# Patient Record
Sex: MALE | Race: WHITE | NOT HISPANIC OR LATINO | URBAN - METROPOLITAN AREA
[De-identification: names, ages, dates, MRNs, and addresses within clinical notes are randomized per-mention and may not be internally consistent; named-entity substitution may affect disease eponyms.]

---

## 2017-05-08 ENCOUNTER — EMERGENCY (EMERGENCY)
Facility: HOSPITAL | Age: 27
LOS: 1 days | Discharge: HOME | End: 2017-05-08
Attending: EMERGENCY MEDICINE | Admitting: EMERGENCY MEDICINE
Payer: COMMERCIAL

## 2017-05-08 VITALS
SYSTOLIC BLOOD PRESSURE: 111 MMHG | TEMPERATURE: 98 F | RESPIRATION RATE: 14 BRPM | HEART RATE: 58 BPM | DIASTOLIC BLOOD PRESSURE: 61 MMHG | OXYGEN SATURATION: 100 %

## 2017-05-08 VITALS
SYSTOLIC BLOOD PRESSURE: 138 MMHG | TEMPERATURE: 98 F | OXYGEN SATURATION: 100 % | DIASTOLIC BLOOD PRESSURE: 80 MMHG | WEIGHT: 149.91 LBS | HEIGHT: 70 IN | RESPIRATION RATE: 14 BRPM | HEART RATE: 72 BPM

## 2017-05-08 LAB
ALBUMIN SERPL ELPH-MCNC: 3.9 G/DL — SIGNIFICANT CHANGE UP (ref 3.3–5)
ALP SERPL-CCNC: 64 U/L — SIGNIFICANT CHANGE UP (ref 30–120)
ALT FLD-CCNC: 22 U/L DA — SIGNIFICANT CHANGE UP (ref 10–60)
ANION GAP SERPL CALC-SCNC: 7 MMOL/L — SIGNIFICANT CHANGE UP (ref 5–17)
AST SERPL-CCNC: 19 U/L — SIGNIFICANT CHANGE UP (ref 10–40)
BASOPHILS # BLD AUTO: 0.1 K/UL — SIGNIFICANT CHANGE UP (ref 0–0.2)
BASOPHILS NFR BLD AUTO: 1.3 % — SIGNIFICANT CHANGE UP (ref 0–2)
BILIRUB SERPL-MCNC: 0.5 MG/DL — SIGNIFICANT CHANGE UP (ref 0.2–1.2)
BUN SERPL-MCNC: 12 MG/DL — SIGNIFICANT CHANGE UP (ref 7–23)
CALCIUM SERPL-MCNC: 8.7 MG/DL — SIGNIFICANT CHANGE UP (ref 8.4–10.5)
CHLORIDE SERPL-SCNC: 105 MMOL/L — SIGNIFICANT CHANGE UP (ref 96–108)
CO2 SERPL-SCNC: 29 MMOL/L — SIGNIFICANT CHANGE UP (ref 22–31)
CREAT SERPL-MCNC: 0.83 MG/DL — SIGNIFICANT CHANGE UP (ref 0.5–1.3)
D DIMER BLD IA.RAPID-MCNC: <150 NG/ML DDU — SIGNIFICANT CHANGE UP
EOSINOPHIL # BLD AUTO: 0 K/UL — SIGNIFICANT CHANGE UP (ref 0–0.5)
EOSINOPHIL NFR BLD AUTO: 0.4 % — SIGNIFICANT CHANGE UP (ref 0–6)
GLUCOSE SERPL-MCNC: 101 MG/DL — HIGH (ref 70–99)
HCT VFR BLD CALC: 45.1 % — SIGNIFICANT CHANGE UP (ref 39–50)
HGB BLD-MCNC: 15.5 G/DL — SIGNIFICANT CHANGE UP (ref 13–17)
LYMPHOCYTES # BLD AUTO: 2.2 K/UL — SIGNIFICANT CHANGE UP (ref 1–3.3)
LYMPHOCYTES # BLD AUTO: 27 % — SIGNIFICANT CHANGE UP (ref 13–44)
MCHC RBC-ENTMCNC: 30.5 PG — SIGNIFICANT CHANGE UP (ref 27–34)
MCHC RBC-ENTMCNC: 34.4 GM/DL — SIGNIFICANT CHANGE UP (ref 32–36)
MCV RBC AUTO: 88.7 FL — SIGNIFICANT CHANGE UP (ref 80–100)
MONOCYTES # BLD AUTO: 0.6 K/UL — SIGNIFICANT CHANGE UP (ref 0–0.9)
MONOCYTES NFR BLD AUTO: 7.9 % — SIGNIFICANT CHANGE UP (ref 2–14)
NEUTROPHILS # BLD AUTO: 5.1 K/UL — SIGNIFICANT CHANGE UP (ref 1.8–7.4)
NEUTROPHILS NFR BLD AUTO: 63.4 % — SIGNIFICANT CHANGE UP (ref 43–77)
PLATELET # BLD AUTO: 192 K/UL — SIGNIFICANT CHANGE UP (ref 150–400)
POTASSIUM SERPL-MCNC: 4.3 MMOL/L — SIGNIFICANT CHANGE UP (ref 3.5–5.3)
POTASSIUM SERPL-SCNC: 4.3 MMOL/L — SIGNIFICANT CHANGE UP (ref 3.5–5.3)
PROT SERPL-MCNC: 6.9 G/DL — SIGNIFICANT CHANGE UP (ref 6–8.3)
RBC # BLD: 5.08 M/UL — SIGNIFICANT CHANGE UP (ref 4.2–5.8)
RBC # FLD: 12.3 % — SIGNIFICANT CHANGE UP (ref 10.3–14.5)
SODIUM SERPL-SCNC: 141 MMOL/L — SIGNIFICANT CHANGE UP (ref 135–145)
WBC # BLD: 8 K/UL — SIGNIFICANT CHANGE UP (ref 3.8–10.5)
WBC # FLD AUTO: 8 K/UL — SIGNIFICANT CHANGE UP (ref 3.8–10.5)

## 2017-05-08 PROCEDURE — 85379 FIBRIN DEGRADATION QUANT: CPT

## 2017-05-08 PROCEDURE — 93010 ELECTROCARDIOGRAM REPORT: CPT

## 2017-05-08 PROCEDURE — 85027 COMPLETE CBC AUTOMATED: CPT

## 2017-05-08 PROCEDURE — 80053 COMPREHEN METABOLIC PANEL: CPT

## 2017-05-08 PROCEDURE — 99283 EMERGENCY DEPT VISIT LOW MDM: CPT | Mod: 25

## 2017-05-08 PROCEDURE — 93005 ELECTROCARDIOGRAM TRACING: CPT

## 2017-05-08 PROCEDURE — 99284 EMERGENCY DEPT VISIT MOD MDM: CPT

## 2017-05-08 NOTE — ED PROVIDER NOTE - OBJECTIVE STATEMENT
pt c/o feeling anxious,, palp and sob this am. pt reports feeling better upon arrival in er. pt relates hx of simialr symptoms 1 year ago, was seen in er, followed up with cardiology, had echo, stress test, no path found at that time. pt denies ha, d/n/v, cp, abd pain, weakness, numbness, travel, calf pain, edema, suicidal or homicidal ideation.  pmrain - liza pt c/o feeling anxious,, palp and sob this am. pt reports feeling better upon arrival in er, no current symptoms. pt relates hx of simialr symptoms 1 year ago, was seen in er, followed up with cardiology, had echo, stress test, no path found at that time. pt denies ha, d/n/v, cp, abd pain, weakness, numbness, travel, calf pain, edema, suicidal or homicidal ideation.  pmd - liza

## 2017-05-08 NOTE — ED ADULT NURSE NOTE - OBJECTIVE STATEMENT
Patient walked into ER c/o feeling like he is having a panic attack while driving to work.  He felt his heart race and felt very edgy.  Now he is feeling much calmer.  This has happened to him before.

## 2017-09-05 ENCOUNTER — EMERGENCY (EMERGENCY)
Facility: HOSPITAL | Age: 27
LOS: 1 days | Discharge: HOME | End: 2017-09-05
Attending: INTERNAL MEDICINE | Admitting: INTERNAL MEDICINE
Payer: COMMERCIAL

## 2017-09-05 VITALS
RESPIRATION RATE: 16 BRPM | DIASTOLIC BLOOD PRESSURE: 76 MMHG | TEMPERATURE: 99 F | WEIGHT: 149.91 LBS | OXYGEN SATURATION: 98 % | HEIGHT: 70 IN | HEART RATE: 87 BPM | SYSTOLIC BLOOD PRESSURE: 124 MMHG

## 2017-09-05 VITALS
RESPIRATION RATE: 16 BRPM | SYSTOLIC BLOOD PRESSURE: 120 MMHG | DIASTOLIC BLOOD PRESSURE: 75 MMHG | HEART RATE: 80 BPM | OXYGEN SATURATION: 98 %

## 2017-09-05 LAB — S PYO AG SPEC QL IA: NEGATIVE — SIGNIFICANT CHANGE UP

## 2017-09-05 PROCEDURE — 99283 EMERGENCY DEPT VISIT LOW MDM: CPT

## 2017-09-05 PROCEDURE — 87880 STREP A ASSAY W/OPTIC: CPT

## 2017-09-05 RX ORDER — AZITHROMYCIN 500 MG/1
1 TABLET, FILM COATED ORAL
Qty: 3 | Refills: 0 | OUTPATIENT
Start: 2017-09-05 | End: 2017-09-08

## 2017-09-05 NOTE — ED PROVIDER NOTE - OBJECTIVE STATEMENT
sore throat today...in last month with sore throat that also eventuated in bilat ear infections and required 2 visits to [facility](not his pmd) and 2 courses of amox...was fine in last few days until today.no fever,ear ache.pt is 1ppd smoker.

## 2017-09-05 NOTE — ED ADULT TRIAGE NOTE - CHIEF COMPLAINT QUOTE
27 yr. old male with c/o "sinus infection" on and off since August 4.  Treated with amoxicillin but symptoms returned x 2.  Today c/o mild sore throat.

## 2017-09-05 NOTE — ED ADULT NURSE NOTE - OBJECTIVE STATEMENT
Amb to ED. Pt c/o mild sore throat since last night. O/E color good. Skin warm & dry to touch . Lungs clear. Throat slight erythemia. Denies headache, fever, nausea or vomiting. Pt has just completed his second round of Amoxicillen for double ear infection.

## 2018-09-01 NOTE — ED PROVIDER NOTE - PROGRESS NOTE
Problem: Patient Care Overview  Goal: Plan of Care/Patient Progress Review  Outcome: Improving  Pt up A2 pivot to w/c. Voiding.purewick at night. Very anxious and fearful. BLE numbness and edema. Passing flatus. Senna and miralax given for constipation. IV ancef- one more dose per ID then switch to PO. Per ID pt can be discharged. PT says ok for pt to return home. Spoke with Dr. Rodriguez who is going to call Dr. Garcia to discuss when pt can dc. Wound vac in place, small amt dried drainage on dressing. Will continue to monitor.       Stable.

## 2019-02-19 NOTE — ED ADULT NURSE NOTE - BREATH SOUNDS, MLM
- Subjective


Encounter Start Date: 02/19/19


Encounter Start Time: 18:30





Patient seen and examined for med mngt. No new complaints. No overnight events





- Objective


MAR Reviewed: Yes


Vital Signs & Weight: 


 Vital Signs (12 hours)











  Temp Pulse Resp BP BP Pulse Ox


 


 02/19/19 21:09   110 H  14    95


 


 02/19/19 19:49  99 F  98  14   162/78 H  95


 


 02/19/19 19:47   98   162/78 H  


 


 02/19/19 19:46     162/78 H  


 


 02/19/19 15:00     170/77 H  


 


 02/19/19 10:50  97.9 F  87  16   160/73 H  97








 Weight











Weight                         197 lb














Result Diagrams: 


 02/20/19 06:02





Additional Labs: 


 Accuchecks











  02/19/19





  15:55


 


POC Glucose  131 H








 Laboratory Tests











  02/14/19





  13:30


 


BUN  19


 


Creatinine  1.18 H











EKG Reviewed by me: Yes ( SR)





Phys Exam





- Physical Examination


Constitutional: NAD


Respiratory: no wheezing, no rhonchi


Cardiovascular: RRR, no rub


Gastrointestinal: soft, non-tender, positive bowel sounds


Musculoskeletal: no edema


Neurological: non-focal, moves all 4 limbs





Dx/Plan





- Plan


DVT proph w/SCDs





1. HTN


2. HLD


3. DM2


4. CKD3


5. Obesity BMI 31.8





PLAN:


Add holding parameters to Clonidine, Hydralazine, Coreg and Lotrel


Add low dose sliding scale


Cont to monitor


Will follow. Thank you for this consultation.


Full code. DPOA- self/family








Review of Systems





- Review of Systems


Cardiovascular: negative: chest pain, palpitations, orthopnea, paroxysmal 

nocturnal dyspnea, edema, light headedness, other


Gastrointestinal: negative: Nausea, Vomiting, Abdominal Pain, Diarrhea, 

Constipation, Melena, Hematochezia, Other





- Medications/Allergies


Allergies/Adverse Reactions: 


 Allergies











Allergy/AdvReac Type Severity Reaction Status Date / Time


 


codeine Allergy   Verified 02/14/19 12:48











Medications: 


 Current Medications





Acetaminophen (Tylenol)  650 mg PO Q4H PRN


   PRN Reason: Headache/Fever or Pain


   Last Admin: 02/19/19 19:49 Dose:  650 mg


Hydrocodone Bitart/Acetaminophen (Norco 10/325)  1 tab PO Q4H PRN


   PRN Reason: Pain (1-3)


Hydrocodone Bitart/Acetaminophen (Norco 10/325)  2 tab PO Q4H PRN


   PRN Reason: PAIN (4-6)


Amlodipine/Benazepril HCl (Lotrel 5/20)  2 cap PO HS [facility]


Aspirin (Ecotrin)  81 mg PO BID [facility]


   Last Admin: 02/19/19 19:47 Dose:  81 mg


Atorvastatin Calcium (Lipitor)  10 mg PO HS [facility]


   Last Admin: 02/19/19 19:47 Dose:  10 mg


Carvedilol (Coreg)  6.25 mg PO BID [facility]


   Last Admin: 02/19/19 19:47 Dose:  6.25 mg


Clonidine (Catapres)  0.2 mg PO TID [facility]


   Last Admin: 02/19/19 19:46 Dose:  0.2 mg


Diphenhydramine HCl (Benadryl)  25 mg PO Q6H PRN


   PRN Reason: Itching


   Last Admin: 02/19/19 21:07 Dose:  25 mg


Diphenhydramine HCl (Benadryl)  25 mg IVP Q3H PRN


   PRN Reason: Itching


Diphenhydramine HCl (Benadryl)  25 mg PO Q3H PRN


   PRN Reason: Itching


Diphenhydramine HCl (Benadryl)  25 mg IM Q3H PRN


   PRN Reason: Itching


Fentanyl (Sublimaze)  50 mcg IV Q1H PRN


   PRN Reason:  BREAKTHROUGH PAIN


   Last Admin: 02/19/19 21:07 Dose:  50 mcg


Ferrous Gluconate (Fergon)  324 mg PO BID-[facility]


Hydralazine HCl (Apresoline)  25 mg PO BID [facility]


   Last Admin: 02/19/19 19:47 Dose:  25 mg


Hydrochlorothiazide (Hydrochlorothiazide)  25 mg PO DAILY [facility]


Ropivacaine 250 ml/ Device  250 mls @ 0 mls/hr NERVE BLCK INF [facility]


Sodium Chloride (Normal Saline 0.9%)  1,000 mls @ 100 mls/hr IV .Q10H [facility]


   Last Admin: 02/19/19 10:50 Dose:  1,000 mls


Cefazolin Sodium/Dextrose 2 gm (/ Device)  50 mls @ 100 mls/hr IVPB Q8HR [facility]


   Stop: 02/19/19 22:29


   Last Admin: 02/19/19 13:57 Dose:  50 mls


Fentanyl Citrate 2,000 mcg/ (Sodium Chloride)  100 mls @ 0 mls/hr IV INF PRN


   PRN Reason: Pain


Iron/Minerals/Multivitamins (Theragran M)  1 tab PO DAILY [facility]


Ketorolac Tromethamine (Toradol)  15 mg IVP Q6H PRN


   PRN Reason: Moderate Pain (4-6)


   Stop: 02/22/19 21:19


Loratadine (Claritin)  10 mg PO DAILY [facility]


Metformin HCl (Glucophage)  500 mg PO QA-[facility]


Miscellaneous Information (Communication Order-Pharmacy)  1 each FS ONE [facility]


Naloxone HCl (Narcan)  0.2 mg IV Q5MIN PRN


   PRN Reason: Opiate Reversal


Ondansetron HCl (Zofran)  4 mg IVP Q6H PRN


   PRN Reason: Nausea/Vomiting


   Last Admin: 02/19/19 15:00 Dose:  4 mg


Ondansetron HCl (Zofran)  4 mg IVP Q6H PRN


   PRN Reason: Nausea/Vomiting


Promethazine HCl (Phenergan)  12.5 mg IM Q4H PRN


   PRN Reason: Nausea


Promethazine HCl (Phenergan)  12.5 mg IM Q4H PRN


   PRN Reason: Nausea/Vomiting


Senna/Docusate Sodium (Senokot S)  2 tab PO BID [facility]


Sodium Chloride (Flush - Normal Saline)  10 ml IVF PRN PRN


   PRN Reason: Saline Flush


Tramadol HCl (Ultram)  50 mg PO Q6H PRN


   PRN Reason: Mild Pain (1-3)


Tramadol HCl (Ultram)  100 mg PO Q6H PRN


   PRN Reason: Moderate Pain 4-6


   Last Admin: 02/19/19 19:48 Dose:  100 mg


Zolpidem Tartrate (Ambien)  5 mg PO HSPRN PRN


   PRN Reason: Insomnia


Zolpidem Tartrate (Ambien)  5 mg PO HSPRN PRN


   PRN Reason: Insomnia Clear

## 2023-10-13 NOTE — ED ADULT NURSE NOTE - INTEGUMENTARY WDL
Home Care Instructions for Gastroscopy with Sedation    Diet:  - Resume your regular diet as tolerated unless otherwise instructed. - Start with light meals to minimize bloating.  - Do not drink alcohol today. Medication:  - If you have questions about resuming your normal medications, please contact your Primary Care Physician. Activities:  - Take it easy today. Do not return to work today. - Do not drive today. - Do not operate any machinery today (including kitchen equipment). Gastroscopy:  - You may have a sore throat for 2-3 days following the exam. This is normal. Gargling with warm salt water (1/2 tsp salt to 1 glass warm water) or using throat lozenges will help. - If you experience any sharp pain in your neck, abdomen or chest, vomiting of blood, oral temperature over 100 degrees Fahrenheit, light-headedness or dizziness, or any other problems, contact your doctor. **If unable to reach your doctor, please go to the [facility] AND [facility] Emergency Room**    - Your referring physician will receive a full report of your examination.  - If you do not hear from your doctor's office within two weeks of your biopsy, please call them for your results. You may be able to see your laboratory results in [facility] between 4 and 7 business days. In some cases, your physician may not have viewed the results before they are released to [address] If you have questions regarding your results contact the physician who ordered the test/exam by phone or via [address] by choosing \"Ask a Medical Question. \" Color consistent with ethnicity/race, warm, dry intact, resilient.
